# Patient Record
Sex: MALE | Race: WHITE | NOT HISPANIC OR LATINO | Employment: FULL TIME | URBAN - METROPOLITAN AREA
[De-identification: names, ages, dates, MRNs, and addresses within clinical notes are randomized per-mention and may not be internally consistent; named-entity substitution may affect disease eponyms.]

---

## 2017-07-02 ENCOUNTER — APPOINTMENT (EMERGENCY)
Dept: RADIOLOGY | Facility: HOSPITAL | Age: 22
End: 2017-07-02
Payer: COMMERCIAL

## 2017-07-02 ENCOUNTER — HOSPITAL ENCOUNTER (EMERGENCY)
Facility: HOSPITAL | Age: 22
Discharge: HOME | End: 2017-07-02
Attending: EMERGENCY MEDICINE | Admitting: EMERGENCY MEDICINE
Payer: COMMERCIAL

## 2017-07-02 VITALS
TEMPERATURE: 97.8 F | RESPIRATION RATE: 16 BRPM | DIASTOLIC BLOOD PRESSURE: 74 MMHG | HEART RATE: 70 BPM | WEIGHT: 190 LBS | OXYGEN SATURATION: 99 % | BODY MASS INDEX: 26.6 KG/M2 | SYSTOLIC BLOOD PRESSURE: 150 MMHG | HEIGHT: 71 IN

## 2017-07-02 DIAGNOSIS — S69.92XA INJURY OF LEFT THUMB: Primary | ICD-10-CM

## 2017-07-02 PROCEDURE — 73130 X-RAY EXAM OF HAND: CPT

## 2017-07-02 PROCEDURE — 99283 EMERGENCY DEPT VISIT LOW MDM: CPT

## 2017-07-02 RX ORDER — CARVEDILOL 6.25 MG/1
6.25 TABLET ORAL 2 TIMES DAILY WITH MEALS
COMMUNITY
End: 2021-03-19 | Stop reason: ALTCHOICE

## 2021-03-10 ENCOUNTER — HOSPITAL ENCOUNTER (EMERGENCY)
Facility: HOSPITAL | Age: 26
Discharge: HOME | End: 2021-03-10
Attending: EMERGENCY MEDICINE
Payer: COMMERCIAL

## 2021-03-10 VITALS
DIASTOLIC BLOOD PRESSURE: 89 MMHG | WEIGHT: 175 LBS | OXYGEN SATURATION: 100 % | SYSTOLIC BLOOD PRESSURE: 148 MMHG | HEART RATE: 94 BPM | BODY MASS INDEX: 24.41 KG/M2 | TEMPERATURE: 97.9 F | RESPIRATION RATE: 18 BRPM

## 2021-03-10 DIAGNOSIS — R00.2 PALPITATIONS: Primary | ICD-10-CM

## 2021-03-10 LAB
ATRIAL RATE: 79 BPM
P AXIS: 74 DEGREES
PR INTERVAL: 142 MS
QRS AXIS: 84 DEGREES
QRSD INTERVAL: 122 MS
QT INTERVAL: 364 MS
QTC INTERVAL: 417 MS
T WAVE AXIS: 59 DEGREES
VENTRICULAR RATE: 79 BPM

## 2021-03-10 PROCEDURE — 93005 ELECTROCARDIOGRAM TRACING: CPT

## 2021-03-10 PROCEDURE — 99284 EMERGENCY DEPT VISIT MOD MDM: CPT | Performed by: EMERGENCY MEDICINE

## 2021-03-10 PROCEDURE — 93010 ELECTROCARDIOGRAM REPORT: CPT | Performed by: INTERNAL MEDICINE

## 2021-03-10 PROCEDURE — 99285 EMERGENCY DEPT VISIT HI MDM: CPT

## 2021-03-10 NOTE — Clinical Note
Sulaiman Braunclair was seen and treated in our emergency department on 3/10/2021  Diagnosis: Palpitations    Lakeisha Viveros  may return to work on return date  He may return on this date: 03/11/2021         If you have any questions or concerns, please don't hesitate to call        Johnie Keane DO    ______________________________           _______________          _______________  Hospital Representative                              Date                                Time

## 2021-03-10 NOTE — ED PROVIDER NOTES
History  Chief Complaint   Patient presents with    Palpitations     Pt stated that he has been having palpitations since bedtime  Palpitations increased just prior to coming into ED  History of myocarditis  Denies SOB today, but did have episodes of SOB last week  21-year-old male presents for evaluation of palpitations  Patient has history of myocarditis in 2017, he was placed on a maintenance multidrug regimen which he was ultimately weaned off of  During that time frame he had a cardiac catheterization which showed clean vessels  Patient had an echocardiogram performed at that time as well as in 2018 which showed normal EF, no gross abnormalities  Patient reports over the last 2 weeks he has had intermittent sensation of palpitations  Palpitations feel like a hard heartbeat, he cannot identify duration of symptoms or exacerbating features  When symptoms occur he feels a thumping sensation, mild dyspneic sensation  Patient states he came to the emergency department tonight as he was having a hard time sleeping due to being fixated on the palpitations  Patient denies increased caffeine or alcohol use, he denies supplements  Patient has been eating and drinking his normal self  Patient does report COVID infection in January with mild symptoms that have resolved  No PVCs or obvious arrhythmia on monitor during course of HPI  Prior to Admission Medications   Prescriptions Last Dose Informant Patient Reported? Taking?   carvedilol (COREG) 6 25 mg tablet 3/9/2021 at Unknown time  Yes Yes   Sig: Take 6 25 mg by mouth 2 (two) times a day with meals      Facility-Administered Medications: None       Past Medical History:   Diagnosis Date    Myocarditis ([facility] [address] )        History reviewed  No pertinent surgical history  History reviewed  No pertinent family history  I have reviewed and agree with the history as documented      E-Cigarette/Vaping    E-Cigarette Use Never User E-Cigarette/Vaping Substances     Social History     Tobacco Use    Smoking status: Never Smoker    Smokeless tobacco: Never Used   Substance Use Topics    Alcohol use: Yes     Comment: occasional    Drug use: No        Review of Systems   Constitutional: Negative for appetite change, chills and fever  Respiratory: Positive for shortness of breath  Cardiovascular: Positive for palpitations  Negative for chest pain and leg swelling  Gastrointestinal: Negative for nausea and vomiting  Neurological: Negative for syncope, light-headedness and headaches  All other systems reviewed and are negative  Physical Exam  ED Triage Vitals   Temperature Pulse Respirations Blood Pressure SpO2   03/10/21 0503 03/10/21 0501 03/10/21 0501 03/10/21 0501 03/10/21 0501   97 9 °F (36 6 °C) 94 18 148/89 100 %      Temp Source Heart Rate Source Patient Position - Orthostatic VS BP Location FiO2 (%)   03/10/21 0503 -- -- -- --   Oral          Pain Score       --                    Orthostatic Vital Signs  Vitals:    03/10/21 0501   BP: 148/89   Pulse: 94       Physical Exam  Vitals signs reviewed  Constitutional:       General: He is not in acute distress  Appearance: Normal appearance  He is normal weight  He is not ill-appearing or toxic-appearing  HENT:      Head: Normocephalic and atraumatic  Right Ear: External ear normal       Left Ear: External ear normal    Eyes:      General:         Right eye: No discharge  Left eye: No discharge  Extraocular Movements: Extraocular movements intact  Cardiovascular:      Rate and Rhythm: Normal rate and regular rhythm  Pulmonary:      Effort: Pulmonary effort is normal  No respiratory distress  Breath sounds: Normal breath sounds  No stridor  No wheezing, rhonchi or rales  Abdominal:      General: There is no distension  Palpations: Abdomen is soft  Tenderness: There is no abdominal tenderness     Musculoskeletal:         General: No deformity or signs of injury  Right lower leg: No edema  Left lower leg: No edema  Skin:     General: Skin is warm  Coloration: Skin is not pale  Neurological:      General: No focal deficit present  Mental Status: He is alert  Mental status is at baseline  Comments: No gross CN, motor deficits         ED Medications  Medications - No data to display    Diagnostic Studies  Results Reviewed     None                 No orders to display         Procedures  ECG 12 Lead Documentation Only    Date/Time: 3/10/2021 6:25 AM  Performed by: Fran Alaniz DO  Authorized by: Fran Alaniz DO     Indications / Diagnosis:  Palpitations  Patient location:  ED  Previous ECG:     Previous ECG:  Unavailable (Exact EKG unaavailable but previous cardiology office visits note RBBB)  Interpretation:     Interpretation: non-specific    Rate:     ECG rate:  79    ECG rate assessment: normal    Rhythm:     Rhythm: sinus rhythm    Ectopy:     Ectopy: none    QRS:     QRS axis:  Normal    QRS intervals:  Normal  Conduction:     Conduction: abnormal      Abnormal conduction: complete RBBB    ST segments:     ST segments:  Normal  T waves:     T waves: normal            ED Course                                       MDM  Number of Diagnoses or Management Options  Palpitations:   Diagnosis management comments: 19-year-old male with previous history of myocarditis presents for evaluation of palpitations  EKG in emergency department shows persistent right bundle-branch block noted previous cardiology office visits, no PVCs or other arrhythmias  During course of HPI from myself and attending he has no evidence a PVC or arrhythmia on monitor  Patient will be discharged home with referral for echocardiogram, 48 hour Holter monitor  Patient was provided follow-up information to help establish a primary care provider        Disposition  Final diagnoses:   Palpitations     Time reflects when diagnosis was documented in both MDM as applicable and the Disposition within this note     Time User Action Codes Description Comment    3/10/2021  6:18 AM Emely Cartwright Add [R00 2] Palpitations       ED Disposition     ED Disposition Condition Date/Time Comment    Discharge Stable Wed Mar 10, 2021  6:18 AM Obey Weber discharge to home/self care  Follow-up Information     Follow up With Specialties Details Why Contact Info Additional Information    Infol[employer]    [phone]       [address] [facility] [address] [address]  [address] [facility]   [address] [facility]  [address] [facility] [address]  [facility] [address], [address], [address] [phone]          Discharge Medication List as of 3/10/2021  6:20 AM      CONTINUE these medications which have NOT CHANGED    Details   carvedilol (COREG) 6 25 mg tablet Take 6 25 mg by mouth 2 (two) times a day with meals, Historical Med           Outpatient Discharge Orders   Holter monitor - 48 hour   Standing Status: Future Standing Exp  Date: 05/09/21     Echo complete with contrast if indicated   Standing Status: Future Standing Exp  Date: 05/09/21       PDMP Review     None           ED Provider  Attending physically available and evaluated Obey Weber I managed the patient along with the ED Attending      Electronically Signed by         Isabelle Birch DO  03/10/21 8450

## 2021-03-10 NOTE — ED ATTENDING ATTESTATION
3/10/2021  IEl MD, saw and evaluated the patient  I have discussed the patient with the resident/non-physician practitioner and agree with the resident's/non-physician practitioner's findings, Plan of Care, and MDM as documented in the resident's/non-physician practitioner's note, except where noted  All available labs and Radiology studies were reviewed  I was present for key portions of any procedure(s) performed by the resident/non-physician practitioner and I was immediately available to provide assistance  At this point I agree with the current assessment done in the Emergency Department  I have conducted an independent evaluation of this patient a history and physical is as follows:    ED Course     Emergency Department Note- Samuel Hunt 31 y o  male MRN: [id number]    Unit/Bed#: ED 10 Encounter: [id number]    Samuel Hunt is a 31 y o  male who presents with   Chief Complaint   Patient presents with    Palpitations     Pt stated that he has been having palpitations since bedtime  Palpitations increased just prior to coming into ED  History of myocarditis  Denies SOB today, but did have episodes of SOB last week  History of Present Illness   HPI:  Samuel Hunt is a 31 y o  male who presents for evaluation of:  Palpitations  Patient has a h/o myocarditis  Patient has been having palpitations on and off since going to bed last night more frequently; patient has been having palpitations intermittently over the last 2 weeks  Patient denies associated lightheadedness and syncope  Patient denies associated illicit drug use and chronic alcohol use  Patient denies associated chest pain  Patient was told at the time that he had myocarditis that his EF was normal      Review of Systems   Constitutional: Negative for chills and fever  HENT: Negative for congestion and rhinorrhea  Respiratory: Positive for shortness of breath (episodically)  Negative for cough  Cardiovascular: Positive for palpitations  Negative for chest pain and leg swelling  Gastrointestinal: Negative for nausea and vomiting  Neurological: Negative for syncope, light-headedness and headaches  All other systems reviewed and are negative  Historical Information   Past Medical History:   Diagnosis Date    Myocarditis ([address] )      History reviewed  No pertinent surgical history  Social History   Social History     Substance and Sexual Activity   Alcohol Use Yes    Comment: occasional     Social History     Substance and Sexual Activity   Drug Use No     Social History     Tobacco Use   Smoking Status Never Smoker   Smokeless Tobacco Never Used     Family History: non-contributory    Meds/Allergies   all medications and allergies reviewed  No Known Allergies    Objective   First Vitals:   Blood Pressure: 148/89 (03/10/21 0501)  Pulse: 94 (03/10/21 0501)  Temperature: 97 9 °F (36 6 °C) (03/10/21 0503)  Temp Source: Oral (03/10/21 0503)  Respirations: 18 (03/10/21 0501)  Weight - Scale: 79 4 kg (175 lb) (03/10/21 0501)  SpO2: 100 % (03/10/21 0501)    Current Vitals:   Blood Pressure: 148/89 (03/10/21 0501)  Pulse: 94 (03/10/21 0501)  Temperature: 97 9 °F (36 6 °C) (03/10/21 0503)  Temp Source: Oral (03/10/21 0503)  Respirations: 18 (03/10/21 0501)  Weight - Scale: 79 4 kg (175 lb) (03/10/21 0501)  SpO2: 100 % (03/10/21 0501)    No intake or output data in the 24 hours ending 03/10/21 0546    Invasive Devices     None                 Physical Exam  Vitals signs and nursing note reviewed  Constitutional:       Appearance: Normal appearance  HENT:      Head: Normocephalic and atraumatic  Cardiovascular:      Rate and Rhythm: Normal rate and regular rhythm  Pulmonary:      Effort: Pulmonary effort is normal       Breath sounds: Normal breath sounds  Abdominal:      General: Bowel sounds are normal       Palpations: Abdomen is soft  Musculoskeletal: Normal range of motion           General: No tenderness  Skin:     General: Skin is warm and dry  Capillary Refill: Capillary refill takes less than 2 seconds  Neurological:      General: No focal deficit present  Mental Status: He is alert and oriented to person, place, and time  Psychiatric:         Mood and Affect: Mood normal          Behavior: Behavior normal          Thought Content: Thought content normal          Judgment: Judgment normal            Medical Decision Makin  Palpitations: ECG    No results found for this or any previous visit (from the past 36 hour(s))  No orders to display         Portions of the record may have been created with voice recognition software  Occasional wrong word or "sound a like" substitutions may have occurred due to the inherent limitations of voice recognition software  Read the chart carefully and recognize, using context, where substitutions have occurred          Critical Care Time  Procedures

## 2021-03-10 NOTE — Clinical Note
Eva Go was seen and treated in our emergency department on 3/10/2021  Diagnosis: Palpitations    Gweneth Shape  may return to work on return date  He may return on this date: 03/11/2021         If you have any questions or concerns, please don't hesitate to call        Coleman Patiño, DO    ______________________________           _______________          _______________  Hospital Representative                              Date                                Time

## 2021-03-19 ENCOUNTER — OFFICE VISIT (OUTPATIENT)
Dept: FAMILY MEDICINE CLINIC | Facility: CLINIC | Age: 26
End: 2021-03-19
Payer: COMMERCIAL

## 2021-03-19 VITALS
BODY MASS INDEX: 24.44 KG/M2 | HEART RATE: 91 BPM | WEIGHT: 174.6 LBS | HEIGHT: 71 IN | DIASTOLIC BLOOD PRESSURE: 80 MMHG | SYSTOLIC BLOOD PRESSURE: 152 MMHG | TEMPERATURE: 97.8 F | RESPIRATION RATE: 15 BRPM | OXYGEN SATURATION: 99 %

## 2021-03-19 DIAGNOSIS — U07.1 COVID-19 VIRUS INFECTION: ICD-10-CM

## 2021-03-19 DIAGNOSIS — Z00.00 PERIODIC HEALTH ASSESSMENT, GENERAL SCREENING, ADULT: Primary | ICD-10-CM

## 2021-03-19 PROCEDURE — 1036F TOBACCO NON-USER: CPT | Performed by: FAMILY MEDICINE

## 2021-03-19 PROCEDURE — 99213 OFFICE O/P EST LOW 20 MIN: CPT | Performed by: FAMILY MEDICINE

## 2021-03-19 PROCEDURE — 3725F SCREEN DEPRESSION PERFORMED: CPT | Performed by: FAMILY MEDICINE

## 2021-03-19 PROCEDURE — 3008F BODY MASS INDEX DOCD: CPT | Performed by: FAMILY MEDICINE

## 2021-03-19 NOTE — PROGRESS NOTES
FAMILY PRACTICE OFFICE VISIT       NAME: Claudia Gerber  AGE: 31 y o  SEX: male       : 1995        MRN: [id number]    DATE: 3/19/2021  TIME: 3:25 PM    Assessment and Plan     Problem List Items Addressed This Visit     None              Chief Complaint     Chief Complaint   Patient presents with    Physical Exam       History of Present Illness     HPI    Review of Systems   Review of Systems   Constitutional: Negative  HENT: Negative  Respiratory: Negative  Cardiovascular: Positive for palpitations  Negative for chest pain and leg swelling  Gastrointestinal: Negative  Musculoskeletal: Negative  Neurological: Negative  Psychiatric/Behavioral: Negative  Active Problem List   There is no problem list on file for this patient  Past Medical History:  Past Medical History:   Diagnosis Date    Myocarditis [facility])        Past Surgical History:  History reviewed  No pertinent surgical history  Family History:  History reviewed  No pertinent family history      Social History:  Social History     Socioeconomic History    Marital status: Single     Spouse name: Not on file    Number of children: Not on file    Years of education: Not on file    Highest education level: Not on file   Occupational History    Not on file   Social Needs    Financial resource strain: Not on file    Food insecurity     Worry: Not on file     Inability: Not on file    Transportation needs     Medical: Not on file     Non-medical: Not on file   Tobacco Use    Smoking status: Never Smoker    Smokeless tobacco: Never Used   Substance and Sexual Activity    Alcohol use: Yes     Comment: occasional    Drug use: No    Sexual activity: Not on file   Lifestyle    Physical activity     Days per week: Not on file     Minutes per session: Not on file    Stress: Not on file   Relationships    Social connections     Talks on phone: Not on file     Gets together: Not on file     Attends Jain service: Not on file     Active member of club or organization: Not on file     Attends meetings of clubs or organizations: Not on file     Relationship status: Not on file    Intimate partner violence     Fear of current or ex partner: Not on file     Emotionally abused: Not on file     Physically abused: Not on file     Forced sexual activity: Not on file   Other Topics Concern    Not on file   Social History Narrative    Not on file       Objective     Vitals:    03/19/21 1520   BP: 152/80   Pulse: 91   Resp: 15   Temp: 97 8 °F (36 6 °C)   SpO2: 99%     Wt Readings from Last 3 Encounters:   03/19/21 79 2 kg (174 lb 9 6 oz)   03/10/21 79 4 kg (175 lb)   07/02/17 86 2 kg (190 lb)       Physical Exam  Constitutional:       General: He is not in acute distress  Appearance: Normal appearance  He is not ill-appearing  HENT:      Head: Normocephalic and atraumatic  Eyes:      General:         Right eye: No discharge  Left eye: No discharge  Extraocular Movements: Extraocular movements intact  Conjunctiva/sclera: Conjunctivae normal       Pupils: Pupils are equal, round, and reactive to light  Cardiovascular:      Rate and Rhythm: Normal rate and regular rhythm  Heart sounds: Normal heart sounds  No murmur  Pulmonary:      Effort: Pulmonary effort is normal  No respiratory distress  Breath sounds: Normal breath sounds  No wheezing, rhonchi or rales  Abdominal:      General: Abdomen is flat  Bowel sounds are normal  There is no distension  Palpations: Abdomen is soft  Tenderness: There is no abdominal tenderness  There is no guarding or rebound  Musculoskeletal:      Right lower leg: No edema  Left lower leg: No edema  Neurological:      General: No focal deficit present  Mental Status: He is alert and oriented to person, place, and time  Mental status is at baseline     Psychiatric:         Mood and Affect: Mood normal          Behavior: Behavior normal  Thought Content: Thought content normal          Judgment: Judgment normal          Pertinent Laboratory/Diagnostic Studies:  No results found for: GLUCOSE, BUN, CREATININE, CALCIUM, NA, K, CO2, CL  No results found for: ALT, AST, GGT, ALKPHOS, BILITOT    No results found for: WBC, HGB, HCT, MCV, PLT    No results found for: TSH    No results found for: CHOL  No results found for: TRIG  No results found for: HDL  No results found for: LDLCALC  No results found for: HGBA1C    Results for orders placed or performed during the hospital encounter of 03/10/21   ECG 12 lead   Result Value Ref Range    Ventricular Rate 79 BPM    Atrial Rate 79 BPM    PA Interval 142 ms    QRSD Interval 122 ms    QT Interval 364 ms    QTC Interval 417 ms    P Axis 74 degrees    QRS Axis 84 degrees    T Wave Axis 59 degrees       No orders of the defined types were placed in this encounter  ALLERGIES:  No Known Allergies    Current Medications     Current Outpatient Medications   Medication Sig Dispense Refill    carvedilol (COREG) 6 25 mg tablet Take 6 25 mg by mouth 2 (two) times a day with meals       No current facility-administered medications for this visit            Health Maintenance     Health Maintenance   Topic Date Due    HPV Vaccine (1 - Male 2-dose series) Never done    Depression Screening PHQ  Never done    HIV Screening  Never done    COVID-19 Vaccine (1) Never done    Annual Physical  Never done    DTaP,Tdap,and Td Vaccines (1 - Tdap) 07/30/2015    Influenza Vaccine (1) 06/30/2021 (Originally 9/1/2020)    BMI: Adult  03/10/2022    Pneumococcal Vaccine: Pediatrics (0 to 5 Years) and At-Risk Patients (6 to 59 Years)  Aged Out    HIB Vaccine  Aged Out    Hepatitis B Vaccine  Aged Out    IPV Vaccine  Aged Out    Hepatitis A Vaccine  Aged Out    Meningococcal ACWY Vaccine  Aged [employer] History   Administered Date(s) Administered    Td (adult), adsorbed 49/00/1725       Trisha Crews MD

## 2021-03-19 NOTE — PROGRESS NOTES
Virtual Regular Visit      Assessment/Plan:    Problem List Items Addressed This Visit        Other    COVID-19 virus infection      COVID-19 infection  Patient appears to be recovering well after establishing a positive COVID test on March 17th  He has taken over-the-counter medications such as Tylenol or Advil for his headache  Overall he states he feels fairly well  He denies any significant chest pain or shortness of breath     Patient will call if symptoms progress in any way  He is aware to quarantine at least 10 days from when symptoms started and should be symptom free before returning to activities as a   He will also self quarantine from his family members and wear mask in his house  Other Visit Diagnoses     Periodic health assessment, general screening, adult    -  Primary    Relevant Orders    CBC    Comprehensive metabolic panel    Lipid panel    TSH, 3rd generation               Reason for visit is   Chief Complaint   Patient presents with    Physical Exam        Encounter provider Aby Elise MD    Provider located at [address] [place] [address]  97       Recent Visits  No visits were found meeting these conditions  Showing recent visits within past 7 days and meeting all other requirements     Today's Visits  Date Type Provider Dept   03/19/21 Office Visit Aby Elise MD [facility] today's visits and meeting all other requirements     Future Appointments  No visits were found meeting these conditions  Showing future appointments within next 150 days and meeting all other requirements        The patient was identified by name and date of birth  Joleen Morro was informed that this is a telemedicine visit and that the visit is being conducted through [employer] and patient was informed that this is not a secure, HIPAA-compliant platform  He agrees to proceed     My office door was closed  No one else was in the room  He acknowledged consent and understanding of privacy and security of the video platform  The patient has agreed to participate and understands they can discontinue the visit at any time  Patient is aware this is a billable service  Subjective  Elas Carranza is a 31 y o  male        Patient having telemedicine visit after testing positive for COVID on March 17th  Patient started feels symptoms on March 13 with sniffling and headaches and some fatigue  He felt he may be coming down with a sinus infection  He denies any shortness of breath or coughing  He did have a mild fever on March 16th however though symptoms have resolved at this point  Denies any significant shortness of breath  He believes he may have been exposed to [place] by a referee that was playing in his hockey game almost 2 weeks ago       Past Medical History:   Diagnosis Date    Myocarditis ([facility] [address] )        History reviewed  No pertinent surgical history  No current outpatient medications on file  No current facility-administered medications for this visit  No Known Allergies    Review of Systems   Constitutional: Positive for fatigue  Negative for fever  HENT: Positive for congestion  Respiratory: Positive for chest tightness  Negative for cough, shortness of breath and wheezing  Cardiovascular: Negative  Gastrointestinal: Negative  Musculoskeletal: Negative  Neurological: Positive for headaches  Video Exam    Vitals:    03/19/21 1520   BP: 152/80   BP Location: Left arm   Patient Position: Sitting   Cuff Size: Adult   Pulse: 91   Resp: 15   Temp: 97 8 °F (36 6 °C)   TempSrc: Temporal   SpO2: 99%   Weight: 79 2 kg (174 lb 9 6 oz)   Height: 5' 11" (1 803 m)       Physical Exam  Constitutional:       General: He is not in acute distress  Appearance: Normal appearance  He is not ill-appearing  HENT:      Head: Normocephalic and atraumatic     Pulmonary: Effort: Pulmonary effort is normal  No respiratory distress  Neurological:      General: No focal deficit present  Mental Status: He is alert and oriented to person, place, and time  Mental status is at baseline  Psychiatric:         Mood and Affect: Mood normal          Behavior: Behavior normal          Thought Content: Thought content normal          Judgment: Judgment normal           I spent 15 minutes with patient today in which greater than 50% of the time was spent in counseling/coordination of care regarding [address] Abhijit acknowledges that he has consented to an online visit or consultation  He understands that the online visit is based solely on information provided by him, and that, in the absence of a face-to-face physical evaluation by the physician, the diagnosis he receives is both limited and provisional in terms of accuracy and completeness  This is not intended to replace a full medical face-to-face evaluation by the physician  Nikolas Valdes understands and accepts these terms

## 2021-03-19 NOTE — ASSESSMENT & PLAN NOTE
COVID-19 infection  Patient appears to be recovering well after establishing a positive COVID test on March 17th  He has taken over-the-counter medications such as Tylenol or Advil for his headache  Overall he states he feels fairly well  He denies any significant chest pain or shortness of breath     Patient will call if symptoms progress in any way  He is aware to quarantine at least 10 days from when symptoms started and should be symptom free before returning to activities as a   He will also self quarantine from his family members and wear mask in his house

## 2021-03-25 ENCOUNTER — APPOINTMENT (OUTPATIENT)
Dept: LAB | Facility: CLINIC | Age: 26
End: 2021-03-25
Payer: COMMERCIAL

## 2021-03-25 ENCOUNTER — TRANSCRIBE ORDERS (OUTPATIENT)
Dept: LAB | Facility: CLINIC | Age: 26
End: 2021-03-25

## 2021-03-25 DIAGNOSIS — Z00.00 PERIODIC HEALTH ASSESSMENT, GENERAL SCREENING, ADULT: ICD-10-CM

## 2021-03-25 LAB
ALBUMIN SERPL BCP-MCNC: 4.2 G/DL (ref 3.5–5)
ALP SERPL-CCNC: 88 U/L (ref 46–116)
ALT SERPL W P-5'-P-CCNC: 32 U/L (ref 12–78)
ANION GAP SERPL CALCULATED.3IONS-SCNC: 8 MMOL/L (ref 4–13)
AST SERPL W P-5'-P-CCNC: 25 U/L (ref 5–45)
BILIRUB SERPL-MCNC: 0.69 MG/DL (ref 0.2–1)
BUN SERPL-MCNC: 13 MG/DL (ref 5–25)
CALCIUM SERPL-MCNC: 9.5 MG/DL (ref 8.3–10.1)
CHLORIDE SERPL-SCNC: 109 MMOL/L (ref 100–108)
CHOLEST SERPL-MCNC: 160 MG/DL (ref 50–200)
CO2 SERPL-SCNC: 24 MMOL/L (ref 21–32)
CREAT SERPL-MCNC: 1.02 MG/DL (ref 0.6–1.3)
ERYTHROCYTE [DISTWIDTH] IN BLOOD BY AUTOMATED COUNT: 13.1 % (ref 11.6–15.1)
GFR SERPL CREATININE-BSD FRML MDRD: 101 ML/MIN/1.73SQ M
GLUCOSE P FAST SERPL-MCNC: 80 MG/DL (ref 65–99)
HCT VFR BLD AUTO: 43 % (ref 36.5–49.3)
HDLC SERPL-MCNC: 65 MG/DL
HGB BLD-MCNC: 14.7 G/DL (ref 12–17)
LDLC SERPL CALC-MCNC: 83 MG/DL (ref 0–100)
MCH RBC QN AUTO: 31.7 PG (ref 26.8–34.3)
MCHC RBC AUTO-ENTMCNC: 34.2 G/DL (ref 31.4–37.4)
MCV RBC AUTO: 93 FL (ref 82–98)
NONHDLC SERPL-MCNC: 95 MG/DL
PLATELET # BLD AUTO: 68 THOUSANDS/UL (ref 149–390)
PMV BLD AUTO: 11.2 FL (ref 8.9–12.7)
POTASSIUM SERPL-SCNC: 3.9 MMOL/L (ref 3.5–5.3)
PROT SERPL-MCNC: 7.5 G/DL (ref 6.4–8.2)
RBC # BLD AUTO: 4.63 MILLION/UL (ref 3.88–5.62)
SODIUM SERPL-SCNC: 141 MMOL/L (ref 136–145)
TRIGL SERPL-MCNC: 58 MG/DL
TSH SERPL DL<=0.05 MIU/L-ACNC: 2.21 UIU/ML (ref 0.36–3.74)
WBC # BLD AUTO: 5.53 THOUSAND/UL (ref 4.31–10.16)

## 2021-03-25 PROCEDURE — 80053 COMPREHEN METABOLIC PANEL: CPT

## 2021-03-25 PROCEDURE — 80061 LIPID PANEL: CPT

## 2021-03-25 PROCEDURE — 36415 COLL VENOUS BLD VENIPUNCTURE: CPT

## 2021-03-25 PROCEDURE — 85027 COMPLETE CBC AUTOMATED: CPT

## 2021-03-25 PROCEDURE — 84443 ASSAY THYROID STIM HORMONE: CPT

## 2021-03-29 DIAGNOSIS — D69.6 THROMBOCYTOPENIA (HCC): Primary | ICD-10-CM

## 2021-04-01 ENCOUNTER — HOSPITAL ENCOUNTER (OUTPATIENT)
Dept: NON INVASIVE DIAGNOSTICS | Facility: HOSPITAL | Age: 26
Discharge: HOME | End: 2021-04-01
Attending: EMERGENCY MEDICINE
Payer: COMMERCIAL

## 2021-04-01 DIAGNOSIS — R00.2 PALPITATIONS: ICD-10-CM

## 2021-04-01 DIAGNOSIS — Z23 ENCOUNTER FOR IMMUNIZATION: ICD-10-CM

## 2021-04-01 PROCEDURE — 93225 XTRNL ECG REC<48 HRS REC: CPT

## 2021-04-01 PROCEDURE — 93226 XTRNL ECG REC<48 HR SCAN A/R: CPT

## 2021-04-07 ENCOUNTER — IMMUNIZATIONS (OUTPATIENT)
Dept: FAMILY MEDICINE CLINIC | Facility: HOSPITAL | Age: 26
End: 2021-04-07

## 2021-04-07 DIAGNOSIS — Z23 ENCOUNTER FOR IMMUNIZATION: Primary | ICD-10-CM

## 2021-04-07 PROCEDURE — 91300 SARS-COV-2 / COVID-19 MRNA VACCINE (PFIZER-BIONTECH) 30 MCG: CPT

## 2021-04-07 PROCEDURE — 0001A SARS-COV-2 / COVID-19 MRNA VACCINE (PFIZER-BIONTECH) 30 MCG: CPT

## 2021-04-07 PROCEDURE — 93227 XTRNL ECG REC<48 HR R&I: CPT | Performed by: INTERNAL MEDICINE

## 2021-04-19 ENCOUNTER — APPOINTMENT (OUTPATIENT)
Dept: LAB | Facility: CLINIC | Age: 26
End: 2021-04-19
Payer: COMMERCIAL

## 2021-04-19 DIAGNOSIS — D69.6 THROMBOCYTOPENIA (HCC): ICD-10-CM

## 2021-04-19 LAB
ERYTHROCYTE [DISTWIDTH] IN BLOOD BY AUTOMATED COUNT: 13.2 % (ref 11.6–15.1)
HCT VFR BLD AUTO: 43.7 % (ref 36.5–49.3)
HGB BLD-MCNC: 14.4 G/DL (ref 12–17)
MCH RBC QN AUTO: 31.4 PG (ref 26.8–34.3)
MCHC RBC AUTO-ENTMCNC: 33 G/DL (ref 31.4–37.4)
MCV RBC AUTO: 95 FL (ref 82–98)
PMV BLD AUTO: 11.8 FL (ref 8.9–12.7)
RBC # BLD AUTO: 4.59 MILLION/UL (ref 3.88–5.62)
WBC # BLD AUTO: 6.15 THOUSAND/UL (ref 4.31–10.16)

## 2021-04-19 PROCEDURE — 36415 COLL VENOUS BLD VENIPUNCTURE: CPT

## 2021-04-19 PROCEDURE — 85027 COMPLETE CBC AUTOMATED: CPT

## 2021-04-26 DIAGNOSIS — D69.6 THROMBOCYTOPENIA (HCC): Primary | ICD-10-CM

## 2021-04-29 ENCOUNTER — IMMUNIZATIONS (OUTPATIENT)
Dept: FAMILY MEDICINE CLINIC | Facility: HOSPITAL | Age: 26
End: 2021-04-29

## 2021-04-29 DIAGNOSIS — Z23 ENCOUNTER FOR IMMUNIZATION: Primary | ICD-10-CM

## 2021-04-29 PROCEDURE — 91300 SARS-COV-2 / COVID-19 MRNA VACCINE (PFIZER-BIONTECH) 30 MCG: CPT

## 2021-04-29 PROCEDURE — 0002A SARS-COV-2 / COVID-19 MRNA VACCINE (PFIZER-BIONTECH) 30 MCG: CPT

## 2021-06-08 ENCOUNTER — TELEPHONE (OUTPATIENT)
Dept: FAMILY MEDICINE CLINIC | Facility: CLINIC | Age: 26
End: 2021-06-08

## 2021-06-09 ENCOUNTER — APPOINTMENT (OUTPATIENT)
Dept: LAB | Facility: CLINIC | Age: 26
End: 2021-06-09
Payer: COMMERCIAL

## 2021-06-09 ENCOUNTER — OFFICE VISIT (OUTPATIENT)
Dept: FAMILY MEDICINE CLINIC | Facility: CLINIC | Age: 26
End: 2021-06-09
Payer: COMMERCIAL

## 2021-06-09 ENCOUNTER — TRANSCRIBE ORDERS (OUTPATIENT)
Dept: ADMINISTRATIVE | Facility: HOSPITAL | Age: 26
End: 2021-06-09

## 2021-06-09 ENCOUNTER — HOSPITAL ENCOUNTER (OUTPATIENT)
Dept: RADIOLOGY | Facility: HOSPITAL | Age: 26
Discharge: HOME | End: 2021-06-09
Payer: COMMERCIAL

## 2021-06-09 VITALS
TEMPERATURE: 98 F | WEIGHT: 175.4 LBS | RESPIRATION RATE: 16 BRPM | DIASTOLIC BLOOD PRESSURE: 80 MMHG | BODY MASS INDEX: 24.56 KG/M2 | SYSTOLIC BLOOD PRESSURE: 110 MMHG | OXYGEN SATURATION: 98 % | HEART RATE: 61 BPM | HEIGHT: 71 IN

## 2021-06-09 DIAGNOSIS — R07.9 CHEST PAIN, UNSPECIFIED TYPE: ICD-10-CM

## 2021-06-09 DIAGNOSIS — I45.10 RIGHT BUNDLE BRANCH BLOCK: ICD-10-CM

## 2021-06-09 DIAGNOSIS — D69.6 THROMBOCYTOPENIA (HCC): ICD-10-CM

## 2021-06-09 DIAGNOSIS — R07.9 CHEST PAIN, UNSPECIFIED TYPE: Primary | ICD-10-CM

## 2021-06-09 DIAGNOSIS — Z86.79 HISTORY OF MYOCARDITIS: ICD-10-CM

## 2021-06-09 DIAGNOSIS — Q24.5 CORONARY-MYOCARDIAL BRIDGE: ICD-10-CM

## 2021-06-09 LAB
ALBUMIN SERPL BCP-MCNC: 4.8 G/DL (ref 3.5–5)
ALP SERPL-CCNC: 86 U/L (ref 46–116)
ALT SERPL W P-5'-P-CCNC: 35 U/L (ref 12–78)
ANION GAP SERPL CALCULATED.3IONS-SCNC: 3 MMOL/L (ref 4–13)
AST SERPL W P-5'-P-CCNC: 24 U/L (ref 5–45)
BILIRUB SERPL-MCNC: 0.73 MG/DL (ref 0.2–1)
BUN SERPL-MCNC: 14 MG/DL (ref 5–25)
CALCIUM SERPL-MCNC: 9.9 MG/DL (ref 8.3–10.1)
CHLORIDE SERPL-SCNC: 105 MMOL/L (ref 100–108)
CO2 SERPL-SCNC: 31 MMOL/L (ref 21–32)
CREAT SERPL-MCNC: 0.96 MG/DL (ref 0.6–1.3)
ERYTHROCYTE [DISTWIDTH] IN BLOOD BY AUTOMATED COUNT: 12.6 % (ref 11.6–15.1)
GFR SERPL CREATININE-BSD FRML MDRD: 109 ML/MIN/1.73SQ M
GLUCOSE SERPL-MCNC: 76 MG/DL (ref 65–140)
HCT VFR BLD AUTO: 45.6 % (ref 36.5–49.3)
HGB BLD-MCNC: 15.3 G/DL (ref 12–17)
MCH RBC QN AUTO: 30.8 PG (ref 26.8–34.3)
MCHC RBC AUTO-ENTMCNC: 33.6 G/DL (ref 31.4–37.4)
MCV RBC AUTO: 92 FL (ref 82–98)
PMV BLD AUTO: 12.2 FL (ref 8.9–12.7)
POTASSIUM SERPL-SCNC: 3.9 MMOL/L (ref 3.5–5.3)
PROT SERPL-MCNC: 8.3 G/DL (ref 6.4–8.2)
RBC # BLD AUTO: 4.96 MILLION/UL (ref 3.88–5.62)
SODIUM SERPL-SCNC: 139 MMOL/L (ref 136–145)
TSH SERPL DL<=0.05 MIU/L-ACNC: 1.58 UIU/ML (ref 0.36–3.74)
WBC # BLD AUTO: 5.19 THOUSAND/UL (ref 4.31–10.16)

## 2021-06-09 PROCEDURE — 93000 ELECTROCARDIOGRAM COMPLETE: CPT | Performed by: NURSE PRACTITIONER

## 2021-06-09 PROCEDURE — 80053 COMPREHEN METABOLIC PANEL: CPT

## 2021-06-09 PROCEDURE — 99213 OFFICE O/P EST LOW 20 MIN: CPT | Performed by: NURSE PRACTITIONER

## 2021-06-09 PROCEDURE — 1036F TOBACCO NON-USER: CPT | Performed by: NURSE PRACTITIONER

## 2021-06-09 PROCEDURE — 71046 X-RAY EXAM CHEST 2 VIEWS: CPT

## 2021-06-09 PROCEDURE — 36415 COLL VENOUS BLD VENIPUNCTURE: CPT

## 2021-06-09 PROCEDURE — 85025 COMPLETE CBC W/AUTO DIFF WBC: CPT

## 2021-06-09 PROCEDURE — 84443 ASSAY THYROID STIM HORMONE: CPT

## 2021-06-09 PROCEDURE — 3008F BODY MASS INDEX DOCD: CPT | Performed by: NURSE PRACTITIONER

## 2021-06-09 NOTE — PROGRESS NOTES
FAMILY PRACTICE OFFICE VISIT       NAME: Sendy Rosado  AGE: 31 y o  SEX: male       : 1995        MRN: [id number]    Assessment and Plan     Problem List Items Addressed This Visit        Cardiovascular and Mediastinum    Coronary-myocardial bridge    Right bundle branch block      Other Visit Diagnoses     Chest pain, unspecified type    -  Primary    Relevant Orders    POCT ECG (Completed)    CBC and differential (Completed)    Comprehensive metabolic panel (Completed)    TSH, 3rd generation (Completed)    Troponin I (Completed)    XR chest pa & lateral (Completed)    Echo stress test w contrast if indicated    History of myocarditis              1  Chest pain, unspecified type  POCT ECG    CBC and differential    Comprehensive metabolic panel    TSH, 3rd generation    Troponin I    XR chest pa & lateral    Echo stress test w contrast if indicated   2  History of myocarditis     3  Coronary-myocardial bridge     4  Right bundle branch block       This 58-year-old male presents today for chest pain occurring intermittently over the past 3 days  Pain is atypical chest pain, occurring in different locations, lasting only a few minutes at most then resolving  However, pain does reoccur multiple times  On exam there is no chest wall tenderness, no rash  Heart rate auscultated with regular rate and rhythm  Denies any dyspnea  He has significant history of myocarditis, with coronary myocardial bridge and right bundle branch block  No longer following with cardiology  Unclear etiology of pain  Recommend checking blood work and chest x-ray as noted  Although, pain is atypical, given known coronary myocardial bridge, and RBBB, need to rule out ischemia, will check echo stress test    He is advised to go to the emergency room if pain should become constant or increased in intensity  Office will call him with results of testing when available       Chief Complaint     Chief Complaint   Patient presents with  Chest Pain     3 + days  sharp and brief       History of Present Illness     Linda Cartagena is a 31year old male presenting today for chest pain  Sharp chest pain, various locations, bilateral chest wall  Lower rib cage region, not always same location  Usually occurs on one side of the chest at a time  Chest pain started Monday evening, just sitting at the end of the day, still working when pain started  Working--, desk work  Pain lasts only a few seconds  Occurs every few minutes, then stops for a few hours, then returns  No pain yet today  No recent illnesses  No shortness of breath  No nausea or vomiting  No diaphoresis  Palpitations--no  No cough  No chest pain with deep breathing  No recent new activities, exercises, or heavy lifting  Yesterday, just felt off  Skin felt hot on Friday, but no fever    Avid rock climber  Went to the gym yesterday, climbing on rock wall at the gym and had no symptoms  When stopped climbing, he felt off again  Anxiety or depression: no    No heartburn or reflux symptoms, unless eating too much  No diarrhea  No sick contacts  Had COVID-19 in January  Had COVID-19 vaccines  Not under any more stress recently  Has history of myocarditis, no cause was determined, thought to be viral    Cleared from cardiology, no longer follows with cardiology  This pain is not like the chest pain he had with myocarditis  Review of Systems   Review of Systems   Constitutional: Negative for chills, diaphoresis, fatigue and fever  HENT: Negative  Respiratory: Negative for cough, chest tightness, shortness of breath and wheezing  Cardiovascular: Positive for chest pain  Negative for palpitations and leg swelling  Gastrointestinal: Negative for abdominal pain, diarrhea, nausea and vomiting  Genitourinary: Negative  Skin: Negative for rash     Neurological: Negative for dizziness, light-headedness and headaches  Psychiatric/Behavioral: Negative for dysphoric mood  The patient is not nervous/anxious  Active Problem List     Patient Active Problem List   Diagnosis    Coronary-myocardial bridge    Right bundle branch block       Past Medical History:  Past Medical History:   Diagnosis Date    COVID-19 virus infection 3/19/2021    Myocarditis ([address] )        Past Surgical History:  No past surgical history on file  Family History:  No family history on file  Social History:  Social History     Socioeconomic History    Marital status: Single     Spouse name: Not on file    Number of children: Not on file    Years of education: Not on file    Highest education level: Not on file   Occupational History    Not on file   Tobacco Use    Smoking status: Never Smoker    Smokeless tobacco: Never Used   Vaping Use    Vaping Use: Never used   Substance and Sexual Activity    Alcohol use: Yes     Comment: occasional    Drug use: No    Sexual activity: Not on file   Other Topics Concern    Not on file   Social History Narrative    Not on file     Social Determinants of Health     Financial Resource Strain:     Difficulty of Paying Living Expenses:    Food Insecurity:     Worried About Running Out of Food in the Last Year:     [address] in the Last Year:    Transportation Needs:     Lack of Transportation (Medical):      Lack of Transportation (Non-Medical):    Physical Activity:     Days of Exercise per Week:     Minutes of Exercise per Session:    Stress:     Feeling of Stress :    Social Connections:     Frequency of Communication with Friends and Family:     Frequency of Social Gatherings with Friends and Family:     Attends [employer] Services:     Active Member of Clubs or Organizations:     Attends Club or Organization Meetings:     Marital Status:    Intimate Partner Violence:     Fear of Current or Ex-Partner:     Emotionally Abused:     Physically Abused:     Sexually Abused:        I have reviewed the patient's medical history in detail; there are no changes to the history as noted in the electronic medical record  Objective     Vitals:    06/09/21 1055   BP: 110/80   Pulse: 61   Resp: 16   Temp: 98 °F (36 7 °C)   TempSrc: Temporal   SpO2: 98%   Weight: 79 6 kg (175 lb 6 4 oz)   Height: 5' 11" (1 803 m)     Wt Readings from Last 3 Encounters:   06/09/21 79 6 kg (175 lb 6 4 oz)   03/19/21 79 2 kg (174 lb 9 6 oz)   03/10/21 79 4 kg (175 lb)     Physical Exam  Vitals and nursing note reviewed  Constitutional:       General: He is not in acute distress  Appearance: He is well-developed  He is not ill-appearing, toxic-appearing or diaphoretic  HENT:      Head: Normocephalic and atraumatic  Right Ear: Tympanic membrane and ear canal normal       Left Ear: Tympanic membrane and ear canal normal       Mouth/Throat:      Mouth: Mucous membranes are moist       Pharynx: Oropharynx is clear  Neck:      Thyroid: No thyromegaly  Cardiovascular:      Rate and Rhythm: Normal rate and regular rhythm  Heart sounds: No murmur heard  Pulmonary:      Effort: Pulmonary effort is normal  No respiratory distress  Breath sounds: Normal breath sounds  No wheezing or rales  Chest:      Chest wall: No mass, deformity or swelling  Comments: No rash, no tenderness  Abdominal:      General: Abdomen is flat  Bowel sounds are normal       Palpations: Abdomen is soft  Tenderness: There is no abdominal tenderness  Musculoskeletal:      Cervical back: Normal range of motion and neck supple  No tenderness  Right lower leg: No edema  Left lower leg: No edema  Lymphadenopathy:      Cervical: No cervical adenopathy  Skin:     General: Skin is warm and dry  Neurological:      Mental Status: He is alert     Psychiatric:         Mood and Affect: Mood normal          Behavior: Behavior normal             ALLERGIES:  No Known Allergies    Current Medications     No current outpatient medications on file  No current facility-administered medications for this visit           Health Maintenance     Health Maintenance   Topic Date Due    Hepatitis C Screening  Never done    Pneumococcal Vaccine: Pediatrics (0 to 5 Years) and At-Risk Patients (6 to 59 Years) (1 of 2 - PPSV23) Never done    HPV Vaccine (1 - Male 2-dose series) Never done    HIV Screening  Never done    Annual Physical  Never done    DTaP,Tdap,and Td Vaccines (1 - Tdap) 07/30/2015    Influenza Vaccine (Season Ended) 09/01/2021    Depression Screening PHQ  03/19/2022    BMI: Adult  06/09/2022    COVID-19 Vaccine  Completed    HIB Vaccine  Aged Out    Hepatitis B Vaccine  Aged Out    IPV Vaccine  Aged Out    Hepatitis A Vaccine  Aged Out    Meningococcal ACWY Vaccine  Aged Out     Immunization History   Administered Date(s) Administered    SARS-CoV-2 / COVID-19 mRNA IM ([employer]) 04/07/2021, 04/29/2021    Td (adult), adsorbed 03/23/2020       MONO Burdick

## 2021-06-10 ENCOUNTER — TRANSCRIBE ORDERS (OUTPATIENT)
Dept: LAB | Facility: AMBULARY SURGERY CENTER | Age: 26
End: 2021-06-10

## 2021-06-10 ENCOUNTER — APPOINTMENT (OUTPATIENT)
Dept: LAB | Facility: AMBULARY SURGERY CENTER | Age: 26
End: 2021-06-10
Payer: COMMERCIAL

## 2021-06-10 ENCOUNTER — TELEPHONE (OUTPATIENT)
Dept: FAMILY MEDICINE CLINIC | Facility: CLINIC | Age: 26
End: 2021-06-10

## 2021-06-10 DIAGNOSIS — R07.9 CHEST PAIN, UNSPECIFIED TYPE: Primary | ICD-10-CM

## 2021-06-10 DIAGNOSIS — R77.9 ELEVATED BLOOD PROTEIN: ICD-10-CM

## 2021-06-10 LAB — TROPONIN I SERPL-MCNC: <0.02 NG/ML

## 2021-06-10 PROCEDURE — 84484 ASSAY OF TROPONIN QUANT: CPT

## 2021-06-10 PROCEDURE — 36415 COLL VENOUS BLD VENIPUNCTURE: CPT

## 2021-06-10 NOTE — TELEPHONE ENCOUNTER
Spoke with pt, results given per NP instructions  Pt did not have BW done as the lab did not have the correct tubes  He will proceed to have this done  New script for BW in one month mailed to pt  Pt did inquire about the CXR done as well  [facility] ([facility] Radiology Reading Room called to expedite this result

## 2021-06-10 NOTE — TELEPHONE ENCOUNTER
----- Message from [id number] [facility] sent at 6/10/2021 12:25 PM EDT -----  Please let patient know his blood count, liver, kidneys, electrolytes, thyroid blood work is good  For some reason the lab did not collect the troponin level  Is he able to complete this?   His protein level is mildly high, would like him to repeat this in 1 month to be sure it returns to normal

## 2021-06-11 ENCOUNTER — TELEPHONE (OUTPATIENT)
Dept: FAMILY MEDICINE CLINIC | Facility: CLINIC | Age: 26
End: 2021-06-11

## 2021-06-11 NOTE — TELEPHONE ENCOUNTER
----- Message from [id number] [facility] sent at 6/10/2021 10:26 PM EDT -----  Thank you for completing Troponin blood work   It is normal

## 2021-06-13 PROBLEM — I45.10 RIGHT BUNDLE BRANCH BLOCK: Status: ACTIVE | Noted: 2021-06-13

## 2021-06-13 PROBLEM — U07.1 COVID-19 VIRUS INFECTION: Status: RESOLVED | Noted: 2021-03-19 | Resolved: 2021-06-13

## 2021-06-13 PROBLEM — Q24.5 CORONARY-MYOCARDIAL BRIDGE: Status: ACTIVE | Noted: 2021-06-13

## 2021-06-17 ENCOUNTER — TELEPHONE (OUTPATIENT)
Dept: FAMILY MEDICINE CLINIC | Facility: CLINIC | Age: 26
End: 2021-06-17

## 2021-06-17 ENCOUNTER — HOSPITAL ENCOUNTER (OUTPATIENT)
Dept: NON INVASIVE DIAGNOSTICS | Facility: HOSPITAL | Age: 26
Discharge: HOME | End: 2021-06-17
Payer: COMMERCIAL

## 2021-06-17 DIAGNOSIS — R07.9 CHEST PAIN, UNSPECIFIED TYPE: ICD-10-CM

## 2021-06-17 PROCEDURE — 93351 STRESS TTE COMPLETE: CPT

## 2021-06-17 PROCEDURE — 93350 STRESS TTE ONLY: CPT

## 2021-06-17 NOTE — TELEPHONE ENCOUNTER
Please let patient know stress test was negative  Please ask if he continues to have any chest pain?

## 2021-06-22 LAB
CHEST PAIN STATEMENT: NORMAL
MAX DIASTOLIC BP: 56 MMHG
MAX HEART RATE: 193 BPM
MAX PREDICTED HEART RATE: 194 BPM
MAX. SYSTOLIC BP: 178 MMHG
PROTOCOL NAME: NORMAL
REASON FOR TERMINATION: NORMAL
TARGET HR FORMULA: NORMAL
TEST INDICATION: NORMAL
TIME IN EXERCISE PHASE: NORMAL